# Patient Record
Sex: FEMALE | Race: WHITE | NOT HISPANIC OR LATINO | Employment: FULL TIME | URBAN - METROPOLITAN AREA
[De-identification: names, ages, dates, MRNs, and addresses within clinical notes are randomized per-mention and may not be internally consistent; named-entity substitution may affect disease eponyms.]

---

## 2024-04-29 ENCOUNTER — OFFICE VISIT (OUTPATIENT)
Dept: NEUROLOGY | Facility: CLINIC | Age: 29
End: 2024-04-29
Payer: COMMERCIAL

## 2024-04-29 VITALS
HEART RATE: 70 BPM | DIASTOLIC BLOOD PRESSURE: 64 MMHG | WEIGHT: 113 LBS | SYSTOLIC BLOOD PRESSURE: 100 MMHG | BODY MASS INDEX: 18.83 KG/M2 | HEIGHT: 65 IN

## 2024-04-29 DIAGNOSIS — G93.40 ENCEPHALOPATHY: Primary | ICD-10-CM

## 2024-04-29 DIAGNOSIS — F90.9 ATTENTION DEFICIT HYPERACTIVITY DISORDER (ADHD), UNSPECIFIED ADHD TYPE: ICD-10-CM

## 2024-04-29 DIAGNOSIS — R56.9 OBSERVED SEIZURE-LIKE ACTIVITY (MULTI): ICD-10-CM

## 2024-04-29 DIAGNOSIS — F41.9 ANXIETY: ICD-10-CM

## 2024-04-29 PROBLEM — K21.9 GERD WITHOUT ESOPHAGITIS: Status: ACTIVE | Noted: 2023-08-10

## 2024-04-29 PROBLEM — E05.90 HYPERTHYROIDISM: Status: ACTIVE | Noted: 2023-08-02

## 2024-04-29 PROBLEM — F31.9 BIPOLAR DISORDER (MULTI): Status: ACTIVE | Noted: 2023-08-02

## 2024-04-29 PROBLEM — F33.2 SEVERE EPISODE OF RECURRENT MAJOR DEPRESSIVE DISORDER, WITHOUT PSYCHOTIC FEATURES (MULTI): Status: ACTIVE | Noted: 2019-10-08

## 2024-04-29 PROBLEM — F41.1 ANXIETY, GENERALIZED: Status: ACTIVE | Noted: 2024-04-29

## 2024-04-29 PROBLEM — F32.A DEPRESSION WITH SUICIDAL IDEATION: Status: ACTIVE | Noted: 2024-04-29

## 2024-04-29 PROBLEM — R09.81 NASAL CONGESTION: Status: ACTIVE | Noted: 2024-04-29

## 2024-04-29 PROBLEM — J31.0 RHINITIS: Status: ACTIVE | Noted: 2024-04-29

## 2024-04-29 PROBLEM — R45.851 DEPRESSION WITH SUICIDAL IDEATION: Status: ACTIVE | Noted: 2024-04-29

## 2024-04-29 PROBLEM — F51.05 INSOMNIA DUE TO MENTAL CONDITION: Status: ACTIVE | Noted: 2019-10-08

## 2024-04-29 PROBLEM — L72.9 FOLLICULAR CYST OF SKIN AND SUBCUTANEOUS TISSUE: Status: ACTIVE | Noted: 2017-07-28

## 2024-04-29 PROBLEM — E05.00 GRAVES DISEASE: Status: ACTIVE | Noted: 2023-09-25

## 2024-04-29 PROBLEM — F33.1 MODERATE EPISODE OF RECURRENT MAJOR DEPRESSIVE DISORDER (MULTI): Status: ACTIVE | Noted: 2024-03-18

## 2024-04-29 PROBLEM — E61.1 IRON DEFICIENCY: Status: ACTIVE | Noted: 2024-04-29

## 2024-04-29 PROBLEM — F42.2 MIXED OBSESSIONAL THOUGHTS AND ACTS: Status: ACTIVE | Noted: 2019-10-08

## 2024-04-29 PROBLEM — F41.1 GAD (GENERALIZED ANXIETY DISORDER): Status: ACTIVE | Noted: 2019-11-13

## 2024-04-29 PROBLEM — E55.9 VITAMIN D DEFICIENCY: Status: ACTIVE | Noted: 2024-04-29

## 2024-04-29 PROBLEM — N85.7 HEMATOMETRA: Status: ACTIVE | Noted: 2024-04-29

## 2024-04-29 PROBLEM — J45.20 MILD INTERMITTENT ASTHMA (HHS-HCC): Status: ACTIVE | Noted: 2023-10-26

## 2024-04-29 PROCEDURE — 1036F TOBACCO NON-USER: CPT | Performed by: PSYCHIATRY & NEUROLOGY

## 2024-04-29 PROCEDURE — 99205 OFFICE O/P NEW HI 60 MIN: CPT | Performed by: PSYCHIATRY & NEUROLOGY

## 2024-04-29 PROCEDURE — 80360 METHYLPHENIDATE: CPT

## 2024-04-29 RX ORDER — METHYLPHENIDATE HYDROCHLORIDE 10 MG/1
10 TABLET ORAL DAILY
Qty: 30 TABLET | Refills: 0 | Status: SHIPPED | OUTPATIENT
Start: 2024-05-28 | End: 2024-06-27

## 2024-04-29 RX ORDER — METHIMAZOLE 5 MG/1
5 TABLET ORAL
COMMUNITY

## 2024-04-29 RX ORDER — METHYLPHENIDATE HYDROCHLORIDE 10 MG/1
10 TABLET ORAL DAILY
Qty: 30 TABLET | Refills: 0 | Status: SHIPPED | OUTPATIENT
Start: 2024-04-29 | End: 2024-05-29

## 2024-04-29 RX ORDER — DESVENLAFAXINE SUCCINATE 50 MG/1
TABLET, EXTENDED RELEASE ORAL
COMMUNITY
Start: 2023-08-18 | End: 2024-04-29 | Stop reason: ALTCHOICE

## 2024-04-29 RX ORDER — FLUTICASONE PROPIONATE 50 MCG
SPRAY, SUSPENSION (ML) NASAL
COMMUNITY

## 2024-04-29 RX ORDER — LORATADINE 10 MG/1
TABLET ORAL
COMMUNITY
End: 2024-04-29 | Stop reason: ALTCHOICE

## 2024-04-29 RX ORDER — PHENAZOPYRIDINE HYDROCHLORIDE 100 MG/1
TABLET, FILM COATED ORAL
COMMUNITY
Start: 2023-11-12 | End: 2024-04-29 | Stop reason: ALTCHOICE

## 2024-04-29 RX ORDER — FLUCONAZOLE 150 MG/1
TABLET ORAL
COMMUNITY
Start: 2023-11-12 | End: 2024-04-29 | Stop reason: ALTCHOICE

## 2024-04-29 RX ORDER — AZITHROMYCIN 250 MG/1
TABLET, FILM COATED ORAL
COMMUNITY
Start: 2023-01-20 | End: 2024-04-29 | Stop reason: ALTCHOICE

## 2024-04-29 RX ORDER — METHYLPHENIDATE HYDROCHLORIDE 20 MG/1
20 CAPSULE, EXTENDED RELEASE ORAL EVERY MORNING
Qty: 30 CAPSULE | Refills: 0 | Status: SHIPPED | OUTPATIENT
Start: 2024-05-28 | End: 2024-06-27

## 2024-04-29 RX ORDER — ASPIRIN 325 MG
TABLET, DELAYED RELEASE (ENTERIC COATED) ORAL
COMMUNITY
Start: 2024-02-13

## 2024-04-29 RX ORDER — METHYLPHENIDATE HYDROCHLORIDE 20 MG/1
20 CAPSULE, EXTENDED RELEASE ORAL EVERY MORNING
Qty: 30 CAPSULE | Refills: 0 | Status: SHIPPED | OUTPATIENT
Start: 2024-04-29 | End: 2024-05-29

## 2024-04-29 RX ORDER — ESCITALOPRAM OXALATE 5 MG/1
5 TABLET ORAL DAILY
COMMUNITY
Start: 2024-03-11 | End: 2024-04-29 | Stop reason: ALTCHOICE

## 2024-04-29 RX ORDER — METRONIDAZOLE 500 MG/1
TABLET ORAL
COMMUNITY
Start: 2023-11-09 | End: 2024-04-29 | Stop reason: ALTCHOICE

## 2024-04-29 RX ORDER — CARIPRAZINE 1.5 MG/1
CAPSULE, GELATIN COATED ORAL
COMMUNITY
Start: 2024-02-13 | End: 2024-04-29 | Stop reason: ALTCHOICE

## 2024-04-29 RX ORDER — FAMOTIDINE 20 MG/1
20 TABLET, FILM COATED ORAL 2 TIMES DAILY
COMMUNITY
Start: 2023-06-27 | End: 2024-04-29 | Stop reason: ALTCHOICE

## 2024-04-29 RX ORDER — FLUOXETINE HYDROCHLORIDE 20 MG/1
60 CAPSULE ORAL
COMMUNITY
Start: 2020-05-07 | End: 2024-04-29 | Stop reason: ALTCHOICE

## 2024-04-29 RX ORDER — IBUPROFEN 600 MG/1
600 TABLET ORAL EVERY 6 HOURS PRN
COMMUNITY
Start: 2015-06-27

## 2024-04-29 RX ORDER — AMOXICILLIN AND CLAVULANATE POTASSIUM 500; 125 MG/1; MG/1
TABLET, FILM COATED ORAL
COMMUNITY
Start: 2023-01-20 | End: 2024-04-29 | Stop reason: ALTCHOICE

## 2024-04-29 RX ORDER — CEPHALEXIN 500 MG/1
CAPSULE ORAL
COMMUNITY
Start: 2023-11-12 | End: 2024-04-29 | Stop reason: ALTCHOICE

## 2024-04-29 RX ORDER — MONTELUKAST SODIUM 10 MG/1
10 TABLET ORAL
COMMUNITY
Start: 2023-03-06 | End: 2024-04-29 | Stop reason: ALTCHOICE

## 2024-04-29 RX ORDER — DESVENLAFAXINE SUCCINATE 25 MG/1
TABLET, EXTENDED RELEASE ORAL
COMMUNITY
Start: 2023-09-11 | End: 2024-04-29 | Stop reason: ALTCHOICE

## 2024-04-29 RX ORDER — FLUTICASONE PROPIONATE 110 UG/1
2 AEROSOL, METERED RESPIRATORY (INHALATION) 2 TIMES DAILY
COMMUNITY
End: 2024-04-29 | Stop reason: ALTCHOICE

## 2024-04-29 RX ORDER — ALBUTEROL SULFATE 90 UG/1
AEROSOL, METERED RESPIRATORY (INHALATION)
COMMUNITY
Start: 2023-10-19

## 2024-04-29 RX ORDER — CARIPRAZINE 3 MG/1
3 CAPSULE, GELATIN COATED ORAL DAILY
COMMUNITY
Start: 2024-03-18 | End: 2024-04-29 | Stop reason: ALTCHOICE

## 2024-04-29 RX ORDER — OMEPRAZOLE 20 MG/1
20 CAPSULE, DELAYED RELEASE ORAL DAILY
COMMUNITY
Start: 2023-07-13 | End: 2024-04-29 | Stop reason: ALTCHOICE

## 2024-04-29 ASSESSMENT — ENCOUNTER SYMPTOMS
NUMBNESS: 0
PALPITATIONS: 0
DECREASED CONCENTRATION: 1
SHORTNESS OF BREATH: 0
FATIGUE: 0
DIFFICULTY URINATING: 0
NECK STIFFNESS: 0
NAUSEA: 0
HALLUCINATIONS: 0
HEADACHES: 0
SLEEP DISTURBANCE: 0
VOMITING: 0
UNEXPECTED WEIGHT CHANGE: 0
DIZZINESS: 0
AGITATION: 0
ADENOPATHY: 0
LIGHT-HEADEDNESS: 0
PHOTOPHOBIA: 0
NERVOUS/ANXIOUS: 1
TROUBLE SWALLOWING: 0
ARTHRALGIAS: 0
WHEEZING: 0
BRUISES/BLEEDS EASILY: 0
ABDOMINAL PAIN: 0
HYPERACTIVE: 1
SEIZURES: 0
EYE PAIN: 0
WEAKNESS: 0
FEVER: 0
COUGH: 0
FACIAL ASYMMETRY: 0
BACK PAIN: 0
SPEECH DIFFICULTY: 0
FREQUENCY: 0
SINUS PRESSURE: 0
JOINT SWELLING: 0
TREMORS: 0
CONFUSION: 0
NECK PAIN: 0
DYSPHORIC MOOD: 1

## 2024-04-29 ASSESSMENT — PATIENT HEALTH QUESTIONNAIRE - PHQ9
1. LITTLE INTEREST OR PLEASURE IN DOING THINGS: NOT AT ALL
SUM OF ALL RESPONSES TO PHQ9 QUESTIONS 1 & 2: 0
2. FEELING DOWN, DEPRESSED OR HOPELESS: NOT AT ALL

## 2024-04-29 NOTE — PROGRESS NOTES
Sera Garcia  28 y.o.       SUBJECTIVE    HPI   Sera 28-year-old young lady who was seen today for evaluation of her possible attention deficit disorder with difficulty at work at home.  Today her physical and neurological exam is normal based on the diagnostic checklist is suggestive of inattentive type of ADHD with some symptoms of hyperactivity.  Since the condition is affecting her socially academically as well as functionally I would like to try her on methylphenidate CD20 milligrams in the morning and 10 in the afternoon.  I would like to schedule her for an EEG to look for any perform discharges and is scheduled to come back and see me in 6 to 8 weeks I have discussed the controlled substance policy, abusive potential, risk benefit and the precautions to be taken which she understood    As you recall, Sera is a 28-year-old young lady who has been having difficulty since early childhood according to her she struggled throughout her elementary middle school and did go to college and then dropped out she is having hard time with her attention span concentration and focusing.  On the diagnostic checklist she had 5 out of 9 symptoms for inattentive and 4 out of 9 for Hyper-Tet type of ADHD    She was born naturally without any complications and her development was normal to early    Lives with her friend and has 2 sisters out of which 1 sister had ADHD and treated when she was in school but currently not on any medication.  Mom had 12 grade education and dad had a few semesters of college and then dropped out and there is a mental health issue both on dad side and with cousin through the which is not known.    No history of any smoking alcohol or any substance abuse.  Due to technical limitations of voice recognition and human error, this note may not accurately reflect the care of the patient.    Review of Systems   Constitutional:  Negative for fatigue, fever and unexpected weight change.   HENT:  Negative for  dental problem, ear pain, hearing loss, sinus pressure, tinnitus and trouble swallowing.    Eyes:  Negative for photophobia, pain and visual disturbance.   Respiratory:  Negative for cough, shortness of breath and wheezing.    Cardiovascular:  Negative for chest pain, palpitations and leg swelling.   Gastrointestinal:  Negative for abdominal pain, nausea and vomiting.   Genitourinary:  Negative for difficulty urinating, enuresis and frequency.   Musculoskeletal:  Negative for arthralgias, back pain, joint swelling, neck pain and neck stiffness.   Skin:  Negative for pallor and rash.   Allergic/Immunologic: Negative for food allergies.   Neurological:  Negative for dizziness, tremors, seizures, syncope, facial asymmetry, speech difficulty, weakness, light-headedness, numbness and headaches.   Hematological:  Negative for adenopathy. Does not bruise/bleed easily.   Psychiatric/Behavioral:  Positive for decreased concentration and dysphoric mood. Negative for agitation, behavioral problems, confusion, hallucinations and sleep disturbance. The patient is nervous/anxious and is hyperactive.         Patient Active Problem List   Diagnosis    Bipolar disorder (Multi)    DEVIKA (generalized anxiety disorder)    GERD without esophagitis    Graves disease    Hyperthyroidism    Insomnia due to mental condition    Mild intermittent asthma (HHS-HCC)    Mixed obsessional thoughts and acts    Severe episode of recurrent major depressive disorder, without psychotic features (Multi)    Follicular cyst of skin and subcutaneous tissue    Hematometra    Iron deficiency    Moderate episode of recurrent major depressive disorder (Multi)    Nasal congestion    Rhinitis    Vitamin D deficiency    Depression with suicidal ideation    Anxiety, generalized    Unspecified anxiety disorder     No past medical history on file.  No past surgical history on file.    reports that she has never smoked. She has never used smokeless tobacco. Alcohol use  "questions deferred to the physician. She reports current drug use. Drug: Marijuana.    /64 (BP Location: Right arm, Patient Position: Sitting, BP Cuff Size: Adult)   Pulse 70   Ht 1.651 m (5' 5\")   Wt 51.3 kg (113 lb)   BMI 18.80 kg/m²     OBJECTIVE  Physical Exam/Neurological Exam   Constitutional: General appearance: no acute distress   Auscultation of Heart: Regular rate and rhythm, no murmurs, normal S1 and S2.   Carotid Arteries: Intact without any bruits.   Neck is supple.   No lymph adenopathy.   Peripheral Vascular Exam: Pulses +2 and equal in all extremities. No swelling, varicosities, edema or tenderness to palpations.    Abdomen is soft, nondistended. No organomegaly.  Mental status: The patient was in no distress, alert, interactive and cooperative. Affect is appropriate.   Orientation: oriented to person, oriented to place and oriented to time.   Memory: recent memory intact and remote memory intact.   Attention: normal attention span and normal concentrating ability.   Language: normal comprehension and no difficulty naming common objects.   Fund of knowledge: Patient displays adequate knowledge of current events, adequate fund of knowledge regarding past history and adequate fund of knowledge regarding vocabulary.   Eyes: The ophthalmoscopic examination was normal. The fundi are visualized with normal disc margins and without.  Cranial nerve II: Visual fields full to confrontation.   Cranial nerves III, IV, and VI: Pupils round, equally reactive to light; no ptosis. EOMs intact. No nystagmus.   Cranial Nerve V: Facial sensation intact bilaterally.   Cranial nerve VII: Normal and symmetric facial strength.   Cranial nerve VIII: Hearing is intact bilaterally to finger rub / whisper.   Cranial nerves IX and X: Palate elevates symmetrically.   Cranial nerve XI: Shoulder shrug and neck rotation strength are intact.   Cranial nerve XII: Tongue midline with normal strength.   Motor: Motor exam was " normal. Muscle bulk was normal in both upper and lower extremities. Muscle tone was normal in both upper and lower extremities. Muscle strength was 5/5 throughout. no abnormal or adventitious movements were present.   Deep Tendon Reflexes: left biceps 2+ , right biceps 2+, left triceps 2+, right triceps 2+, left brachioradialis 2+, right brachioradialis 2+, left patella 2+, right patella 2+, left ankle jerk 2+, right ankle jerk 2+   Plantar Reflex: Toes downgoing to plantar stimulation on the left. Toes downgoing to plantar stimulation on the right.   Sensory Exam: Normal to light touch.   Coordination: There is no limb dystaxia and rapid alternating movements are intact.  Gait: Gait is normal without spasticity, ataxia or bradykinesia. Stance is stable with a negative Romberg.      ASSESSMENT/PLAN  Diagnoses and all orders for this visit:  Encephalopathy  Attention deficit hyperactivity disorder (ADHD), unspecified ADHD type  -     methylphenidate CD (Metadate CD) 20 mg daily capsule; Take 1 capsule (20 mg) by mouth once daily in the morning. Do not crush or chew.  -     methylphenidate CD (Metadate CD) 20 mg daily capsule; Take 1 capsule (20 mg) by mouth once daily in the morning. Do not crush or chew. Do not fill before May 28, 2024.  -     methylphenidate (Ritalin) 10 mg tablet; Take 1 tablet (10 mg) by mouth once daily. At 3-4 pm as needed.  -     methylphenidate (Ritalin) 10 mg tablet; Take 1 tablet (10 mg) by mouth once daily. Do not fill before May 28, 2024.  -     Methylphenidate And Metabolite,Conf,Urine; Future  Anxiety  Observed seizure-like activity (Multi)  -     EEG; Future  -     Methylphenidate And Metabolite,Conf,Urine; Future        Tino Farooq MD  4/29/2024  12:21 PM

## 2024-05-06 LAB
ME-PHENIDATE UR-MCNC: <10 NG/ML
PPAA UR-MCNC: <50 NG/ML

## 2024-06-17 ENCOUNTER — APPOINTMENT (OUTPATIENT)
Dept: NEUROLOGY | Facility: CLINIC | Age: 29
End: 2024-06-17
Payer: COMMERCIAL

## 2024-07-18 ENCOUNTER — APPOINTMENT (OUTPATIENT)
Dept: NEUROLOGY | Facility: CLINIC | Age: 29
End: 2024-07-18
Payer: COMMERCIAL